# Patient Record
Sex: FEMALE | Race: WHITE | Employment: FULL TIME | URBAN - METROPOLITAN AREA
[De-identification: names, ages, dates, MRNs, and addresses within clinical notes are randomized per-mention and may not be internally consistent; named-entity substitution may affect disease eponyms.]

---

## 2017-08-03 ENCOUNTER — ANESTHESIA EVENT (OUTPATIENT)
Dept: ENDOSCOPY | Facility: HOSPITAL | Age: 37
End: 2017-08-03
Payer: COMMERCIAL

## 2017-08-04 ENCOUNTER — ANESTHESIA (OUTPATIENT)
Dept: ENDOSCOPY | Facility: HOSPITAL | Age: 37
End: 2017-08-04
Payer: COMMERCIAL

## 2017-08-04 ENCOUNTER — SURGERY (OUTPATIENT)
Age: 37
End: 2017-08-04

## 2017-08-04 ENCOUNTER — HOSPITAL ENCOUNTER (OUTPATIENT)
Facility: HOSPITAL | Age: 37
Setting detail: HOSPITAL OUTPATIENT SURGERY
Discharge: HOME | End: 2017-08-04
Attending: INTERNAL MEDICINE | Admitting: INTERNAL MEDICINE
Payer: COMMERCIAL

## 2017-08-04 VITALS
TEMPERATURE: 99 F | WEIGHT: 175 LBS | DIASTOLIC BLOOD PRESSURE: 85 MMHG | HEART RATE: 56 BPM | HEIGHT: 65 IN | OXYGEN SATURATION: 100 % | RESPIRATION RATE: 20 BRPM | BODY MASS INDEX: 29.16 KG/M2 | SYSTOLIC BLOOD PRESSURE: 111 MMHG

## 2017-08-04 DIAGNOSIS — R13.19 OTHER DYSPHAGIA: ICD-10-CM

## 2017-08-04 LAB
POCT LOT NUMBER: NORMAL
POCT URINE PREGNANCY: NEGATIVE

## 2017-08-04 PROCEDURE — 88305 TISSUE EXAM BY PATHOLOGIST: CPT | Performed by: INTERNAL MEDICINE

## 2017-08-04 PROCEDURE — 0DB28ZX EXCISION OF MIDDLE ESOPHAGUS, VIA NATURAL OR ARTIFICIAL OPENING ENDOSCOPIC, DIAGNOSTIC: ICD-10-PCS | Performed by: INTERNAL MEDICINE

## 2017-08-04 PROCEDURE — 81025 URINE PREGNANCY TEST: CPT | Performed by: INTERNAL MEDICINE

## 2017-08-04 RX ORDER — SODIUM CHLORIDE, SODIUM LACTATE, POTASSIUM CHLORIDE, CALCIUM CHLORIDE 600; 310; 30; 20 MG/100ML; MG/100ML; MG/100ML; MG/100ML
INJECTION, SOLUTION INTRAVENOUS CONTINUOUS
Status: DISCONTINUED | OUTPATIENT
Start: 2017-08-04 | End: 2017-08-04

## 2017-08-04 RX ORDER — NALOXONE HYDROCHLORIDE 0.4 MG/ML
80 INJECTION, SOLUTION INTRAMUSCULAR; INTRAVENOUS; SUBCUTANEOUS AS NEEDED
Status: DISCONTINUED | OUTPATIENT
Start: 2017-08-04 | End: 2017-08-04

## 2017-08-04 NOTE — OPERATIVE REPORT
[address] [facility] OPERATIVE REPORT   PATIENT NAME: Shelbi Hensley  MRN: [id number]  DATE OF OPERATION: 8/4/2017  PREOPERATIVE DIAGNOSIS:   1.  GERD   POSTOPERATIVE DIAGNOSES:   Esophagitis s/p biopsy   Tortuous distal esophagus  Possib

## 2017-08-04 NOTE — DISCHARGE SUMMARY
Outpatient Surgery Brief Discharge Summary         Patient ID:  Ryan De La Torre  [id number]  40year old  8/2/1980    Discharge Diagnoses: esophagitis    Procedures:EGD  Discharged Condition: stable    Disposition: home    Patient Instructions:  Follow-up w

## 2017-08-04 NOTE — H&P
HPI:  Milli Tran is a 39year old female. 8/2/1980. Patient presents with:  Consult: egd  Difficulty Swallowing           Thank you for sending your patient to see me for evaluation of  Dysphagia.     Pt presents for complaints of dysphagia.  Pt h denies new myalgias, swelling  NEURO: denies new sensory or motor deficits.   PSYCHE: denies depression or anxiety  HEMATOLOGIC: denies bleeding or bruising  ENDOCRINE: denies inc in thirst or heat/cold intolerance  ALL/ASTHMA: denies hx of allergy or asthm (postoperative nausea and vomiting)        Blood pressure 106/75, pulse 75, temperature 98.8 °F (37.1 °C), temperature source Oral, resp. rate 16, height 5' 5\" (1.651 m), weight 175 lb (79.4 kg), last menstrual period 07/23/2017, SpO2 98 %.     HPI    Revi

## 2017-08-04 NOTE — ANESTHESIA POSTPROCEDURE EVALUATION
[facility] Patient Status:  Hospital Outpatient Surgery   Age/Gender 40year old female MRN [id number]   Location [address] [facility]. Attending Inder Mcleod MD   Hosp Day # 0 PCP Joann Morales       Anesthesia Post-op Not

## 2017-08-04 NOTE — ANESTHESIA PREPROCEDURE EVALUATION
PRE-OP EVALUATION    Patient Name: Sommer Banks    Pre-op Diagnosis: Other dysphagia [R13.19]    Procedure(s):  ESOPHAGOGASTRODUODENOSCOPY     Surgeon(s) and Role:     * Brit Acosta MD - Primary    Pre-op vitals reviewed.         Body mass index is

## 2017-08-15 NOTE — PROGRESS NOTES
8/15/2017  Delano Coleman [phone]    Dear Joy December,       Here are the biopsy/pathology findings from your recent EGD (Upper  Endoscopy) :     a normal biopsy of the esophagus with no evidence of inflamamation.

## (undated) DEVICE — 3M™ RED DOT™ MONITORING ELECTRODE WITH FOAM TAPE AND STICKY GEL, 50/BAG, 20/CASE, 72/PLT 2570: Brand: RED DOT™

## (undated) DEVICE — ENDOSCOPY PACK UPPER: Brand: MEDLINE INDUSTRIES, INC.

## (undated) DEVICE — FILTERLINE NASAL ADULT O2/CO2

## (undated) DEVICE — Device: Brand: DEFENDO AIR/WATER/SUCTION AND BIOPSY VALVE

## (undated) DEVICE — FORCEP BIOPSY RJ4 LG CAP W/ND

## (undated) DEVICE — 1200CC GUARDIAN II: Brand: GUARDIAN

## (undated) NOTE — LETTER
8/15/2017          Delano Coleman [phone]    Dear Anita Jones,       Here are the biopsy/pathology findings from your recent EGD (Upper  Endoscopy) :     a normal biopsy of the esophagus with no evidence of inflamam